# Patient Record
Sex: FEMALE | Race: NATIVE HAWAIIAN OR OTHER PACIFIC ISLANDER | NOT HISPANIC OR LATINO
[De-identification: names, ages, dates, MRNs, and addresses within clinical notes are randomized per-mention and may not be internally consistent; named-entity substitution may affect disease eponyms.]

---

## 2024-05-06 PROBLEM — Z00.00 ENCOUNTER FOR PREVENTIVE HEALTH EXAMINATION: Status: ACTIVE | Noted: 2024-05-06

## 2024-05-07 ENCOUNTER — TRANSCRIPTION ENCOUNTER (OUTPATIENT)
Age: 29
End: 2024-05-07

## 2024-05-07 ENCOUNTER — EMERGENCY (EMERGENCY)
Facility: HOSPITAL | Age: 29
LOS: 1 days | Discharge: HOME | End: 2024-05-07
Attending: STUDENT IN AN ORGANIZED HEALTH CARE EDUCATION/TRAINING PROGRAM | Admitting: STUDENT IN AN ORGANIZED HEALTH CARE EDUCATION/TRAINING PROGRAM
Payer: COMMERCIAL

## 2024-05-07 VITALS
TEMPERATURE: 98 F | OXYGEN SATURATION: 98 % | SYSTOLIC BLOOD PRESSURE: 102 MMHG | DIASTOLIC BLOOD PRESSURE: 71 MMHG | RESPIRATION RATE: 20 BRPM | HEART RATE: 72 BPM

## 2024-05-07 VITALS
RESPIRATION RATE: 20 BRPM | SYSTOLIC BLOOD PRESSURE: 138 MMHG | HEART RATE: 89 BPM | OXYGEN SATURATION: 99 % | HEIGHT: 66 IN | DIASTOLIC BLOOD PRESSURE: 82 MMHG | WEIGHT: 119.93 LBS | TEMPERATURE: 97 F

## 2024-05-07 LAB
ADD ON TEST-SPECIMEN IN LAB: SIGNIFICANT CHANGE UP
HCG SERPL-ACNC: <1 MIU/ML — SIGNIFICANT CHANGE UP

## 2024-05-07 PROCEDURE — 84702 CHORIONIC GONADOTROPIN TEST: CPT

## 2024-05-07 PROCEDURE — 80053 COMPREHEN METABOLIC PANEL: CPT

## 2024-05-07 PROCEDURE — 99284 EMERGENCY DEPT VISIT MOD MDM: CPT | Mod: 25

## 2024-05-07 PROCEDURE — 36415 COLL VENOUS BLD VENIPUNCTURE: CPT

## 2024-05-07 PROCEDURE — 36000 PLACE NEEDLE IN VEIN: CPT

## 2024-05-07 PROCEDURE — 99284 EMERGENCY DEPT VISIT MOD MDM: CPT

## 2024-05-07 PROCEDURE — 85025 COMPLETE CBC W/AUTO DIFF WBC: CPT

## 2024-05-07 RX ORDER — ACETAMINOPHEN 500 MG
975 TABLET ORAL ONCE
Refills: 0 | Status: COMPLETED | OUTPATIENT
Start: 2024-05-07 | End: 2024-05-07

## 2024-05-07 RX ORDER — SODIUM CHLORIDE 9 MG/ML
1000 INJECTION INTRAMUSCULAR; INTRAVENOUS; SUBCUTANEOUS ONCE
Refills: 0 | Status: COMPLETED | OUTPATIENT
Start: 2024-05-07 | End: 2024-05-07

## 2024-05-07 RX ADMIN — Medication 975 MILLIGRAM(S): at 21:02

## 2024-05-07 RX ADMIN — SODIUM CHLORIDE 1000 MILLILITER(S): 9 INJECTION INTRAMUSCULAR; INTRAVENOUS; SUBCUTANEOUS at 21:02

## 2024-05-07 RX ADMIN — Medication 975 MILLIGRAM(S): at 22:12

## 2024-05-07 NOTE — ED ADULT TRIAGE NOTE - CHIEF COMPLAINT QUOTE
Pt presents to ED c./o bloody stools x 1 month. Pt A&Ox4, conversive in full sentences and ambulatory. Pt denies CP/SOB.

## 2024-05-07 NOTE — ED ADULT NURSE NOTE - OBJECTIVE STATEMENT
Pt is a 28yo female presenting to ED c/o bloody stools. Pt states, "I first had bloody stool 1 year ago but it went away. My family has a history of colorectal cancer and my brother was recently diagnosed. For the past month I have had bloody stools causing difficulty going to the bathroom and interchanging constipation and diarrhea. When I eat I feel nauseas and have abdominal pain but I have not thrown up. I have not been able to eat since friday though and I have been having a more intense menstrual cycle. I am scheduled with my gastroenterologist tomorrow, but starting today I felt weaker and lightheaded but I also have not eaten much." Pt is A&Ox4, breathing even and unlabored speaking in clear full sentences, denies current chest pain, sob, f/c.

## 2024-05-07 NOTE — ED ADULT NURSE REASSESSMENT NOTE - NS ED NURSE REASSESS COMMENT FT1
Received pt from day shift RN. Speaking in clear coherent sentences, respirations are spontaneous and unlabored. A&Ox4, RA, NAD.

## 2024-05-08 ENCOUNTER — NON-APPOINTMENT (OUTPATIENT)
Age: 29
End: 2024-05-08

## 2024-05-08 ENCOUNTER — APPOINTMENT (OUTPATIENT)
Dept: COLORECTAL SURGERY | Facility: CLINIC | Age: 29
End: 2024-05-08
Payer: COMMERCIAL

## 2024-05-08 VITALS — BODY MASS INDEX: 18.96 KG/M2 | HEIGHT: 66 IN | TEMPERATURE: 97.9 F | WEIGHT: 118 LBS

## 2024-05-08 DIAGNOSIS — Z87.891 PERSONAL HISTORY OF NICOTINE DEPENDENCE: ICD-10-CM

## 2024-05-08 DIAGNOSIS — Z80.0 FAMILY HISTORY OF MALIGNANT NEOPLASM OF DIGESTIVE ORGANS: ICD-10-CM

## 2024-05-08 DIAGNOSIS — K62.5 HEMORRHAGE OF ANUS AND RECTUM: ICD-10-CM

## 2024-05-08 DIAGNOSIS — D64.9 ANEMIA, UNSPECIFIED: ICD-10-CM

## 2024-05-08 DIAGNOSIS — R19.4 CHANGE IN BOWEL HABIT: ICD-10-CM

## 2024-05-08 DIAGNOSIS — Z87.19 PERSONAL HISTORY OF OTHER DISEASES OF THE DIGESTIVE SYSTEM: ICD-10-CM

## 2024-05-08 PROCEDURE — 99203 OFFICE O/P NEW LOW 30 MIN: CPT | Mod: 25

## 2024-05-08 PROCEDURE — 45330 DIAGNOSTIC SIGMOIDOSCOPY: CPT

## 2024-05-08 NOTE — ED PROVIDER NOTE - OBJECTIVE STATEMENT
28 yo F no pmh here for hematochezia x 1 year, worsening over past month. Decreased oral intake. has GI appt tomorrow. on menstrual period now. No vomiting, no fever. Brother had colon CA at 34, older brother had h/o IBD. Also had oral lesions last week, none now. Change in BM from hard stools to bloody soft mucous stools.

## 2024-05-08 NOTE — ED PROVIDER NOTE - CLINICAL SUMMARY MEDICAL DECISION MAKING FREE TEXT BOX
28 yo F no pmh here for hematochezia x 1 year, worsening over past month. Decreased oral intake. has GI appt tomorrow. on menstrual period now. No vomiting, no fever. Brother had colon CA at 34, older brother had h/o IBD. Also had oral lesions last week, none now. VS normal, dry MM, mildly anxious, RRR, lungs CTABL, abd soft non-ttp, deferred rectal (showed me pictures of blood in stool today). labs unremarkable, bhcg neg. given 1L NS. Stool cultures ordered, however patient unable to give stool sample here in Ed. tolerating oral. suspect IBD vs diverticular bleed, low concern for malignancy. Has GI follow-up in the AM. Stable for discharge with strict return precautions.

## 2024-05-08 NOTE — ED PROVIDER NOTE - NSFOLLOWUPINSTRUCTIONS_ED_ALL_ED_FT
Rectal Bleeding    Rectal bleeding is when blood comes out of the opening of the butt (anus). You may see bright red blood in your underwear or in the toilet after you poop (have a bowel movement). You may also have blood mixed with your poop (stool), or dark red or black poop.    Rectal bleeding is often a sign that something is wrong. It can be caused by many things. It needs to be checked by a doctor.    Follow these instructions at home:  Medicines    Take over-the-counter and prescription medicines only as told by your doctor.  Ask your doctor about changing or stopping your normal medicines. These include blood thinners.  Managing constipation    Pear, berries, artichoke, and dried beans.  Your condition may cause trouble pooping (constipation). To prevent or treat this, or to help make your poop soft, you may need to:  Drink enough fluid to keep your pee (urine) pale yellow.  Take over-the-counter or prescription medicines.  Eat foods that are high in fiber. These include beans, whole grains, and fresh fruits and vegetables.  Limit foods that are high in fat and sugar. These include fried or sweet foods.  General instructions    Try not to strain when you poop.  Take a warm bath. This may help with pain.  Watch for changes in your symptoms.  Contact a doctor if:  You have pain or swelling in your belly (abdomen).  You have a fever.  You feel weak or like you may vomit.  You cannot poop.  You have new or more bleeding.  You have black or dark red poop.  You vomit blood or something that looks like coffee grounds.  Get help right away if:  You faint.  You have very bad pain in your butt.  These symptoms may be an emergency. Get help right away. Call 911.  Do not wait to see if the symptoms will go away.  Do not drive yourself to the hospital.  This information is not intended to replace advice given to you by your health care provider. Make sure you discuss any questions you have with your health care provider.    Document Revised: 08/08/2023 Document Reviewed: 08/08/2023  [employer] Patient Education © 2024 [employer]

## 2024-05-08 NOTE — PHYSICAL EXAM
[FreeTextEntry1] : Medical assistant present for duration of physical examination   General no acute distress, alert and oriented Psych responding appropriately to questions Nonlabored breathing Ambulating without assistance Skin normal color and pigment, no visible lesions or rashes    Abdomen thin, soft, nondistended, nontender  Anorectal Exam: Inspection no erythema, induration or fluctuance, no skin excoriation, no fissure, no external hemorrhoidal inflammation or tag formation MORRIS nontender, no masses palpated, no blood on gloved finger  Procedure: Anoscopy   Pre procedure Diagnosis: rectal bleeding Post procedure Diagnosis: rectal bleeding Anesthesia: none Estimated blood loss: none Specimen: none Complications: none   Consent obtained. Anoscopy was performed by passing a lighted anoscope with lubricant jelly into the anal canal and the entire anal mucosal surface was inspected. Findings included no fissure, small mild internal hemorrhoids, no visible masses or lesions in anal canal   Patient tolerated examination and procedure well.

## 2024-05-08 NOTE — ED PROVIDER NOTE - PHYSICAL EXAMINATION
VITAL SIGNS: I have reviewed nursing notes and confirm.  CONSTITUTIONAL: Well appearing, in no acute distress.   SKIN:  warm and dry, no acute rash.   HEAD:  normocephalic, atraumatic.  EYES: EOM intact; conjunctiva and sclera clear.  ENT: No nasal discharge; airway clear.   NECK: Supple; non tender.  CARD: S1, S2 normal; no murmurs, gallops, or rubs. Regular rate and rhythm.   RESP:  Clear to auscultation b/l, no wheezes, rales or rhonchi.  ABD: Normal bowel sounds; soft; non-distended; non-tender; no guarding/ rebound.  EXT: Normal ROM. No clubbing, cyanosis or edema. 2+ pulses to b/l ue/le.  NEURO: Alert, oriented, grossly unremarkable  PSYCH: Cooperative, mood and affect appropriate.  MORRIS: Deferred (see MDM)

## 2024-05-08 NOTE — PROCEDURE
[FreeTextEntry1] : Procedure: Flexible Sigmoidoscopy  Pre procedure Diagnosis: rectal bleeding, change in bowel habits Post procedure Diagnosis: rectal bleeding, change in bowel habits Anesthesia: none Estimated blood loss: none Specimen: none Complications: none  Consent obtained. Enema performed. Flexible sigmoidoscopy was performed by passing a lighted sigmoidoscope with lubricant jelly into the anorectal canal and the entire mucosal surface was inspected. Scope was advanced 25 cm from the anal verge. Findings included no blood or stool intraluminally, the visualized mucosa was without abnormality, no visible masses or lesions.   Patient tolerated examination and procedure well.

## 2024-05-08 NOTE — ED PROVIDER NOTE - PATIENT PORTAL LINK FT
You can access the FollowMyHealth Patient Portal offered by [facility] by registering at the following website: [email or web address]. By joining [employer]’s FollowMyHealth portal, you will also be able to view your health information using other applications (apps) compatible with our system.

## 2024-05-08 NOTE — HISTORY OF PRESENT ILLNESS
[FreeTextEntry1] : 30 y/o F presents for initial visit  Reason for visit: "Severe stool changes, tenesmus, blood in stool" Referred by: Dr. Mark Sandoval  PMH: Anemia, recurrent UTIs PSH: Bladder Diverticulum surgery 10 years ago Meds: Prophylactic Cipro for Recurrent UTI Allergies: Morphine, Bactrim (bloody stool)  Former Smoker   Patient reports one episode of rectal bleeding while in [place] 01/2024 after being given Bactrim for UTI, patient states after stopping medication the symptoms resolved after 3 days. States since then has intermittent blood in stool.   Patient reports 1 month ago, changing in stools (firmer and/or loose), multiple BMs a day, small pieces and incomplete emptying. States noting blood with almost every BM noted on bowl and when wiping. Patient states she has been noticing mucous, blood and also undigested food in stool which she never had before. Patient admits having to push at times in order to have a bowel movement. Denies any abdominal pain. Denies any rectal pain. Denies any external tissue swelling.   Patient reports saw PCP 04/19/2024 and blood work was done, CEA<2, ferritin 12, H/H stable, vitamin B12 normal (336).   Patient states she has been eating less in the last 5 days as to try and reduce multiple bathroom trips. Denies any change in appetite, or unintentional weight loss.  Yesterday 05/07/2024 went to the ER ([facility]) due to feeling weak, labs done Hgb 12.2, Hct 40.1 as per patient no imaging was done, was given fluids and felt better.  As per patient no imaging has been done so far.   Patient reports in the last few months noting menses has been heavier and lasting longer. Denies any severe pain.   Vegan, Plant based diet, hydrates well.   Has never seen a GI in past. Denies prior Colonoscopy  Reports family history of colon cancer (sibling) at age 35, Stage 3C

## 2024-05-09 DIAGNOSIS — K92.1 MELENA: ICD-10-CM

## 2024-05-09 DIAGNOSIS — Z88.5 ALLERGY STATUS TO NARCOTIC AGENT: ICD-10-CM

## 2024-05-15 ENCOUNTER — NON-APPOINTMENT (OUTPATIENT)
Age: 29
End: 2024-05-15

## 2024-05-15 LAB
BACTERIA STL CULT: NORMAL
DEPRECATED O AND P PREP STL: NORMAL

## 2024-09-11 ENCOUNTER — PROBLEM (OUTPATIENT)
Dept: URBAN - METROPOLITAN AREA CLINIC 48 | Facility: CLINIC | Age: 29
End: 2024-09-11

## 2024-09-11 DIAGNOSIS — H57.11: ICD-10-CM

## 2024-09-11 PROCEDURE — 92002 INTRM OPH EXAM NEW PATIENT: CPT

## 2024-09-11 ASSESSMENT — VISUAL ACUITY
OD_CC: 20/20-2
OS_CC: 20/25+1

## 2024-09-11 ASSESSMENT — TONOMETRY
OS_IOP_MMHG: 12
OD_IOP_MMHG: 10